# Patient Record
Sex: FEMALE | Race: OTHER | Employment: FULL TIME | URBAN - METROPOLITAN AREA
[De-identification: names, ages, dates, MRNs, and addresses within clinical notes are randomized per-mention and may not be internally consistent; named-entity substitution may affect disease eponyms.]

---

## 2017-09-14 PROCEDURE — 87591 N.GONORRHOEAE DNA AMP PROB: CPT | Performed by: INTERNAL MEDICINE

## 2017-09-14 PROCEDURE — 87491 CHLMYD TRACH DNA AMP PROBE: CPT | Performed by: INTERNAL MEDICINE

## 2017-09-14 PROCEDURE — 88175 CYTOPATH C/V AUTO FLUID REDO: CPT | Performed by: INTERNAL MEDICINE

## 2018-11-21 PROCEDURE — 88175 CYTOPATH C/V AUTO FLUID REDO: CPT | Performed by: INTERNAL MEDICINE

## 2019-03-18 PROCEDURE — 88175 CYTOPATH C/V AUTO FLUID REDO: CPT | Performed by: OBSTETRICS & GYNECOLOGY

## 2020-08-10 PROBLEM — F41.9 ANXIETY: Status: ACTIVE | Noted: 2019-12-01

## 2020-08-10 PROBLEM — G43.909 MIGRAINES: Status: ACTIVE | Noted: 2019-01-01

## 2020-08-10 PROBLEM — F32.A DEPRESSION: Status: ACTIVE | Noted: 2019-12-01

## 2021-07-09 PROBLEM — U07.1 COVID-19 AFFECTING PREGNANCY IN SECOND TRIMESTER: Status: ACTIVE | Noted: 2021-07-09

## 2021-07-09 PROBLEM — O98.512 COVID-19 AFFECTING PREGNANCY IN SECOND TRIMESTER: Status: ACTIVE | Noted: 2021-07-09

## 2021-07-09 PROBLEM — Z3A.28 28 WEEKS GESTATION OF PREGNANCY: Status: ACTIVE | Noted: 2021-07-09

## 2021-07-09 PROBLEM — O36.8121: Status: ACTIVE | Noted: 2021-07-09

## 2022-04-29 ENCOUNTER — TELEPHONE (OUTPATIENT)
Dept: OBGYN CLINIC | Facility: CLINIC | Age: 27
End: 2022-04-29

## 2022-04-29 NOTE — TELEPHONE ENCOUNTER
Patient calling to initiate prenatal care  LMP 2/28/22  Patient is 7-8 weeks on 4/25/22  Confirmation Ultrasound and Appointment scheduled on 5/4/22    Any history of ectopic pregnancy? NO  Any history of miscarriage? NO  Any medications that you are taking on a regular basis other than prenatal vitamins?  FLUOXETINE 20 MG 1X PER DAY   Any bleeding since the first day of last LMP and your positive pregnancy test? [facility]    Patient delivered a stillborn child June 2021

## 2022-04-29 NOTE — TELEPHONE ENCOUNTER
New patient. LMP: 2/28/22   GA: 8 4/7 wks by provided lmp    Contacted patient. Discussed medications- to contact prescribing provider regarding fluoxetine. Zoloft is preferred. can also discuss with MD at visit next week. Provided [facility] contact and [facility] [employer] for support if needed.

## 2022-06-01 ENCOUNTER — ULTRASOUND ENCOUNTER (OUTPATIENT)
Dept: OBGYN CLINIC | Facility: CLINIC | Age: 27
End: 2022-06-01
Payer: COMMERCIAL

## 2022-06-01 ENCOUNTER — OFFICE VISIT (OUTPATIENT)
Dept: OBGYN CLINIC | Facility: CLINIC | Age: 27
End: 2022-06-01
Payer: COMMERCIAL

## 2022-06-01 VITALS
HEIGHT: 59 IN | DIASTOLIC BLOOD PRESSURE: 72 MMHG | SYSTOLIC BLOOD PRESSURE: 106 MMHG | HEART RATE: 108 BPM | WEIGHT: 147.5 LBS | BODY MASS INDEX: 29.73 KG/M2

## 2022-06-01 DIAGNOSIS — N91.1 SECONDARY AMENORRHEA: Primary | ICD-10-CM

## 2022-06-01 PROBLEM — O36.8121: Status: RESOLVED | Noted: 2021-07-09 | Resolved: 2022-06-01

## 2022-06-01 PROBLEM — O98.512 COVID-19 AFFECTING PREGNANCY IN SECOND TRIMESTER: Status: RESOLVED | Noted: 2021-07-09 | Resolved: 2022-06-01

## 2022-06-01 PROBLEM — U07.1 COVID-19 AFFECTING PREGNANCY IN SECOND TRIMESTER: Status: RESOLVED | Noted: 2021-07-09 | Resolved: 2022-06-01

## 2022-06-01 PROBLEM — Z87.59 HISTORY OF IUFD: Status: ACTIVE | Noted: 2022-06-01

## 2022-06-01 PROBLEM — Z3A.28 28 WEEKS GESTATION OF PREGNANCY: Status: RESOLVED | Noted: 2021-07-09 | Resolved: 2022-06-01

## 2022-06-01 PROCEDURE — 76856 US EXAM PELVIC COMPLETE: CPT | Performed by: OBSTETRICS & GYNECOLOGY

## 2022-06-01 PROCEDURE — 3078F DIAST BP <80 MM HG: CPT | Performed by: OBSTETRICS & GYNECOLOGY

## 2022-06-01 PROCEDURE — 3008F BODY MASS INDEX DOCD: CPT | Performed by: OBSTETRICS & GYNECOLOGY

## 2022-06-01 PROCEDURE — 3074F SYST BP LT 130 MM HG: CPT | Performed by: OBSTETRICS & GYNECOLOGY

## 2022-06-01 PROCEDURE — 99203 OFFICE O/P NEW LOW 30 MIN: CPT | Performed by: OBSTETRICS & GYNECOLOGY

## 2022-06-01 RX ORDER — GLYCERIN/MINERAL OIL
LOTION (ML) TOPICAL
COMMUNITY

## 2022-06-28 ENCOUNTER — INITIAL PRENATAL (OUTPATIENT)
Dept: OBGYN CLINIC | Facility: CLINIC | Age: 27
End: 2022-06-28
Payer: COMMERCIAL

## 2022-06-28 VITALS
HEART RATE: 103 BPM | SYSTOLIC BLOOD PRESSURE: 112 MMHG | BODY MASS INDEX: 31 KG/M2 | WEIGHT: 153 LBS | DIASTOLIC BLOOD PRESSURE: 64 MMHG

## 2022-06-28 DIAGNOSIS — F32.A DEPRESSION AFFECTING PREGNANCY: ICD-10-CM

## 2022-06-28 DIAGNOSIS — Z87.59 HISTORY OF IUFD: ICD-10-CM

## 2022-06-28 DIAGNOSIS — Z12.4 ENCOUNTER FOR SCREENING FOR CERVICAL CANCER: ICD-10-CM

## 2022-06-28 DIAGNOSIS — Z34.00 SUPERVISION OF NORMAL FIRST PREGNANCY, ANTEPARTUM: Primary | ICD-10-CM

## 2022-06-28 DIAGNOSIS — Z34.90 PREGNANCY, UNSPECIFIED GESTATIONAL AGE: ICD-10-CM

## 2022-06-28 DIAGNOSIS — O99.340 DEPRESSION AFFECTING PREGNANCY: ICD-10-CM

## 2022-06-28 DIAGNOSIS — F41.9 ANXIETY: ICD-10-CM

## 2022-06-28 LAB
GLUCOSE (URINE DIPSTICK): NEGATIVE MG/DL
MULTISTIX LOT#: NORMAL NUMERIC
PROTEIN (URINE DIPSTICK): NEGATIVE MG/DL

## 2022-06-28 PROCEDURE — 81002 URINALYSIS NONAUTO W/O SCOPE: CPT | Performed by: OBSTETRICS & GYNECOLOGY

## 2022-06-28 PROCEDURE — 3078F DIAST BP <80 MM HG: CPT | Performed by: OBSTETRICS & GYNECOLOGY

## 2022-06-28 PROCEDURE — 3074F SYST BP LT 130 MM HG: CPT | Performed by: OBSTETRICS & GYNECOLOGY

## 2022-06-28 RX ORDER — SERTRALINE HYDROCHLORIDE 25 MG/1
25 TABLET, FILM COATED ORAL DAILY
Qty: 90 TABLET | Refills: 3 | Status: SHIPPED | OUTPATIENT
Start: 2022-06-28

## 2022-07-07 LAB
ABSOLUTE BASOPHILS: 30 CELLS/UL (ref 0–200)
ABSOLUTE EOSINOPHILS: 53 CELLS/UL (ref 15–500)
ABSOLUTE LYMPHOCYTES: 1816 CELLS/UL (ref 850–3900)
ABSOLUTE MONOCYTES: 372 CELLS/UL (ref 200–950)
ABSOLUTE NEUTROPHILS: 5328 CELLS/UL (ref 1500–7800)
BASOPHILS: 0.4 %
EOSINOPHILS: 0.7 %
HEMATOCRIT: 37.2 % (ref 35–45)
HEMOGLOBIN: 12.4 G/DL (ref 11.7–15.5)
LYMPHOCYTES: 23.9 %
MCH: 30.2 PG (ref 27–33)
MCHC: 33.3 G/DL (ref 32–36)
MCV: 90.5 FL (ref 80–100)
MONOCYTES: 4.9 %
MPV: 9.5 FL (ref 7.5–12.5)
NEUTROPHILS: 70.1 %
PLATELET COUNT: 274 THOUSAND/UL (ref 140–400)
RDW: 13.1 % (ref 11–15)
RED BLOOD CELL COUNT: 4.11 MILLION/UL (ref 3.8–5.1)
RUBELLA ANTIBODY (IGG): <0.9 INDEX
SIGNAL TO CUT-OFF: 0.02
WHITE BLOOD CELL COUNT: 7.6 THOUSAND/UL (ref 3.8–10.8)

## 2022-07-11 PROBLEM — Z28.39 RUBELLA NON-IMMUNE STATUS, ANTEPARTUM: Status: ACTIVE | Noted: 2022-07-11

## 2022-07-11 PROBLEM — O09.899 RUBELLA NON-IMMUNE STATUS, ANTEPARTUM: Status: ACTIVE | Noted: 2022-07-11

## 2022-07-26 ENCOUNTER — ROUTINE PRENATAL (OUTPATIENT)
Dept: OBGYN CLINIC | Facility: CLINIC | Age: 27
End: 2022-07-26
Payer: COMMERCIAL

## 2022-07-26 VITALS — DIASTOLIC BLOOD PRESSURE: 62 MMHG | BODY MASS INDEX: 32 KG/M2 | SYSTOLIC BLOOD PRESSURE: 104 MMHG | WEIGHT: 157.38 LBS

## 2022-07-26 DIAGNOSIS — Z87.59 HISTORY OF IUFD: ICD-10-CM

## 2022-07-26 DIAGNOSIS — Z28.39 RUBELLA NON-IMMUNE STATUS, ANTEPARTUM: ICD-10-CM

## 2022-07-26 DIAGNOSIS — Z34.00 SUPERVISION OF NORMAL FIRST PREGNANCY, ANTEPARTUM: Primary | ICD-10-CM

## 2022-07-26 DIAGNOSIS — O09.899 RUBELLA NON-IMMUNE STATUS, ANTEPARTUM: ICD-10-CM

## 2022-07-26 DIAGNOSIS — Z13.79 GENETIC SCREENING: ICD-10-CM

## 2022-07-26 DIAGNOSIS — F32.A DEPRESSION AFFECTING PREGNANCY: ICD-10-CM

## 2022-07-26 DIAGNOSIS — Z3A.16 16 WEEKS GESTATION OF PREGNANCY: ICD-10-CM

## 2022-07-26 DIAGNOSIS — O99.340 DEPRESSION AFFECTING PREGNANCY: ICD-10-CM

## 2022-07-26 PROCEDURE — 3078F DIAST BP <80 MM HG: CPT | Performed by: OBSTETRICS & GYNECOLOGY

## 2022-07-26 PROCEDURE — 81002 URINALYSIS NONAUTO W/O SCOPE: CPT | Performed by: OBSTETRICS & GYNECOLOGY

## 2022-07-26 PROCEDURE — 3074F SYST BP LT 130 MM HG: CPT | Performed by: OBSTETRICS & GYNECOLOGY

## 2022-07-26 NOTE — PROGRESS NOTES
DONELL    GA 16w1d   22  1655   BP: 104/62   Weight: 157 lb 6 oz (71.4 kg)       Doing well. No complaints. Denies abdominal/pelvic pain or vaginal bleeding. Rh +   Genetic screening requested cffDNA and order provided. D/w patient MSAFP, order provided. Recommend Anatomy scan 20 wks by level 2 US  Prenatal labs reviewed   Reports improvement with Zoloft for depression and anxiety      Problem List Items Addressed This Visit        Obstetrical    Depression affecting pregnancy    Supervision of normal first pregnancy, antepartum - Primary    Relevant Orders    MATERNIT-21 SEQUENOM KIT COLLECTION    AFP MATERNAL SERUM    Rubella non-immune status, antepartum       Other    History of IUFD      Other Visit Diagnoses     16 weeks gestation of pregnancy        Relevant Orders    URINALYSIS NONAUTO W/O SCOPE (OB URISTIX) (Completed)    Genetic screening        Relevant Orders    MATERNIT-21 SEQUENOM KIT COLLECTION    AFP MATERNAL SERUM            RTC in 4 weeks           Note to patient and family   The [facility] makes medical notes available to patients in the interest of transparency. However, please be advised that this is a medical document. It is intended as jfro-lc-mxpa communication. It is written and medical language may contain abbreviations or verbiage that are technical and unfamiliar. It may appear blunt or direct. Medical documents are intended to carry relevant information, facts as evident, and the clinical opinion of the practitioner.

## 2022-07-28 ENCOUNTER — LAB ENCOUNTER (OUTPATIENT)
Dept: LAB | Facility: HOSPITAL | Age: 27
End: 2022-07-28
Attending: OBSTETRICS & GYNECOLOGY
Payer: COMMERCIAL

## 2022-07-28 DIAGNOSIS — Z34.00 SUPERVISION OF NORMAL FIRST PREGNANCY: Primary | ICD-10-CM

## 2022-07-28 DIAGNOSIS — Z13.79 GENETIC SCREENING: ICD-10-CM

## 2022-07-28 PROCEDURE — 82105 ALPHA-FETOPROTEIN SERUM: CPT

## 2022-07-28 PROCEDURE — 36415 COLL VENOUS BLD VENIPUNCTURE: CPT

## 2022-07-29 LAB
AFP MOM: 1.06
AFP, SERUM: 34.1 NG/ML
CALC'D GESTATIONAL AGE: 16.4 WEEKS
DONOR EGG: NO
HX OF NEURAL TUBE DEFECTS: NO
INSULIN DEPEND DIABETIC: NO
MATERNAL WEIGHT: 157 LBS
NUMBER OF FETUSES: 1
PREV PREGNANCY DOWN SYND: NO
REPEAT SPECIMEN: NO

## 2022-07-30 LAB
AFP SMOKING: NO
FAMILY HX NEURAL TUBE DEFECT: NO
INSULIN REQ MATERNAL DIABETES: NO
MATERNAL AGE OF DELIVERY: 27.7 YR
MOM FOR AFP: 0.91
PATIENT'S AFP: 32 NG/ML

## 2022-08-01 ENCOUNTER — TELEPHONE (OUTPATIENT)
Dept: OBGYN CLINIC | Facility: CLINIC | Age: 27
End: 2022-08-01

## 2022-08-01 NOTE — TELEPHONE ENCOUNTER
[id number] results reviewed. Negative for chromosome abnormality of 21, 18 and 13. Genetic screening within normal limits and no further intervention indicated at this time. Patient contacted and notified. Patient informed of negative results. Patient requested [facility] message with gender formation. [facility] message sent. All questions and concerns were addressed.

## 2022-08-19 ENCOUNTER — TELEPHONE (OUTPATIENT)
Dept: OBGYN CLINIC | Facility: CLINIC | Age: 27
End: 2022-08-19

## 2022-08-19 NOTE — TELEPHONE ENCOUNTER
Pt called, she is 19 wks ob now and she has a dentist appt on Tuesday, 8/23 for cleaning. Because she will be a new pt to them, they told her they need to do an x ray but before they can do it they need a note form us that it's okay for her to have. Kindly send the note on my chart and she will print it from there for her to bring.

## 2022-08-23 ENCOUNTER — OFFICE VISIT (OUTPATIENT)
Dept: PERINATAL CARE | Facility: HOSPITAL | Age: 27
End: 2022-08-23
Attending: OBSTETRICS & GYNECOLOGY
Payer: COMMERCIAL

## 2022-08-23 VITALS
HEART RATE: 99 BPM | WEIGHT: 163 LBS | DIASTOLIC BLOOD PRESSURE: 72 MMHG | SYSTOLIC BLOOD PRESSURE: 108 MMHG | HEIGHT: 59 IN | BODY MASS INDEX: 32.86 KG/M2

## 2022-08-23 DIAGNOSIS — Z87.59 HISTORY OF IUFD: Primary | ICD-10-CM

## 2022-08-23 DIAGNOSIS — F32.A DEPRESSION AFFECTING PREGNANCY: ICD-10-CM

## 2022-08-23 DIAGNOSIS — O99.212 OBESITY AFFECTING PREGNANCY IN SECOND TRIMESTER: ICD-10-CM

## 2022-08-23 DIAGNOSIS — Z87.59 HISTORY OF IUFD: ICD-10-CM

## 2022-08-23 DIAGNOSIS — O99.340 DEPRESSION AFFECTING PREGNANCY: ICD-10-CM

## 2022-08-23 PROCEDURE — 76811 OB US DETAILED SNGL FETUS: CPT | Performed by: OBSTETRICS & GYNECOLOGY

## 2022-08-23 RX ORDER — ASPIRIN 81 MG/1
121 TABLET ORAL DAILY
COMMUNITY

## 2022-08-30 ENCOUNTER — ROUTINE PRENATAL (OUTPATIENT)
Dept: OBGYN CLINIC | Facility: CLINIC | Age: 27
End: 2022-08-30
Payer: COMMERCIAL

## 2022-08-30 VITALS — DIASTOLIC BLOOD PRESSURE: 60 MMHG | BODY MASS INDEX: 33 KG/M2 | SYSTOLIC BLOOD PRESSURE: 104 MMHG | WEIGHT: 164 LBS

## 2022-08-30 DIAGNOSIS — Z34.00 SUPERVISION OF NORMAL FIRST PREGNANCY, ANTEPARTUM: ICD-10-CM

## 2022-08-30 DIAGNOSIS — Z34.80 PRENATAL CARE OF MULTIGRAVIDA, ANTEPARTUM: Primary | ICD-10-CM

## 2022-08-30 LAB
GLUCOSE (URINE DIPSTICK): NEGATIVE MG/DL
MULTISTIX LOT#: NORMAL NUMERIC

## 2022-08-30 PROCEDURE — 3074F SYST BP LT 130 MM HG: CPT | Performed by: NURSE PRACTITIONER

## 2022-08-30 PROCEDURE — 81002 URINALYSIS NONAUTO W/O SCOPE: CPT | Performed by: NURSE PRACTITIONER

## 2022-08-30 PROCEDURE — 3078F DIAST BP <80 MM HG: CPT | Performed by: NURSE PRACTITIONER

## 2022-08-30 NOTE — PROGRESS NOTES
DONELL  Doing well, no immediate concerns or questions.   FM is good  RH positive  S/P Anatomy scan with MFM  1 hr glucose/ CBC ordered, advised she wait 3 weeks  RTC 4wks

## 2022-09-14 LAB
ABSOLUTE BASOPHILS: 39 CELLS/UL (ref 0–200)
ABSOLUTE EOSINOPHILS: 94 CELLS/UL (ref 15–500)
ABSOLUTE LYMPHOCYTES: 1755 CELLS/UL (ref 850–3900)
ABSOLUTE MONOCYTES: 390 CELLS/UL (ref 200–950)
ABSOLUTE NEUTROPHILS: 5522 CELLS/UL (ref 1500–7800)
BASOPHILS: 0.5 %
EOSINOPHILS: 1.2 %
GLUCOSE, GESTATIONAL SCREEN (50G)-130 CUTOFF: 78 MG/DL
HEMATOCRIT: 40.4 % (ref 35–45)
HEMOGLOBIN: 13.6 G/DL (ref 11.7–15.5)
LYMPHOCYTES: 22.5 %
MCH: 31.2 PG (ref 27–33)
MCHC: 33.7 G/DL (ref 32–36)
MCV: 92.7 FL (ref 80–100)
MONOCYTES: 5 %
MPV: 9.6 FL (ref 7.5–12.5)
NEUTROPHILS: 70.8 %
PLATELET COUNT: 312 THOUSAND/UL (ref 140–400)
RDW: 12.2 % (ref 11–15)
RED BLOOD CELL COUNT: 4.36 MILLION/UL (ref 3.8–5.1)
WHITE BLOOD CELL COUNT: 7.8 THOUSAND/UL (ref 3.8–10.8)

## 2022-09-15 DIAGNOSIS — F32.A DEPRESSION AFFECTING PREGNANCY: ICD-10-CM

## 2022-09-15 DIAGNOSIS — O99.340 DEPRESSION AFFECTING PREGNANCY: ICD-10-CM

## 2022-09-15 DIAGNOSIS — F41.9 ANXIETY: ICD-10-CM

## 2022-09-15 RX ORDER — SERTRALINE HYDROCHLORIDE 25 MG/1
25 TABLET, FILM COATED ORAL DAILY
Qty: 90 TABLET | Refills: 3 | OUTPATIENT
Start: 2022-09-15

## 2022-09-27 ENCOUNTER — ROUTINE PRENATAL (OUTPATIENT)
Dept: OBGYN CLINIC | Facility: CLINIC | Age: 27
End: 2022-09-27

## 2022-09-27 VITALS
DIASTOLIC BLOOD PRESSURE: 60 MMHG | HEIGHT: 59 IN | BODY MASS INDEX: 34.71 KG/M2 | WEIGHT: 172.19 LBS | SYSTOLIC BLOOD PRESSURE: 102 MMHG

## 2022-09-27 DIAGNOSIS — Z34.00 SUPERVISION OF NORMAL FIRST PREGNANCY, ANTEPARTUM: Primary | ICD-10-CM

## 2022-09-27 LAB
GLUCOSE (URINE DIPSTICK): NEGATIVE MG/DL
MULTISTIX LOT#: NORMAL NUMERIC

## 2022-09-27 PROCEDURE — 81002 URINALYSIS NONAUTO W/O SCOPE: CPT | Performed by: OBSTETRICS & GYNECOLOGY

## 2022-09-27 PROCEDURE — 3078F DIAST BP <80 MM HG: CPT | Performed by: OBSTETRICS & GYNECOLOGY

## 2022-09-27 PROCEDURE — 3074F SYST BP LT 130 MM HG: CPT | Performed by: OBSTETRICS & GYNECOLOGY

## 2022-09-27 PROCEDURE — 3008F BODY MASS INDEX DOCD: CPT | Performed by: OBSTETRICS & GYNECOLOGY

## 2022-09-27 NOTE — PROGRESS NOTES
DONELL--25w1d    Doing overall ok. Denies Vb, LOF, contractions. Positive fetal movement. Wonders when she should start kick counts and how to prevent subsequent IUFD   BSUS done due to fetal movement, FHT present and normal  Cephalic and visibly normal RAHEL    A/P:   1. FHT-P  2. PNL: HIV ordered  3. RH positive  4. History of 27 week IUFD: normal level 2, has appt for f/u US with MFM on 10/11, continue serial monthly US and start NSTs at 32 weeks. We reviewed the controversy regarding kick counts, and I did discuss that recent literature does not support kick counts as preventative for IUFD. However, she should feel normal fetal movement for herself, and at this point daily FM  5.  Reviewed TDAP for upcoming visit      Return in 2-3 weeks

## 2022-09-27 NOTE — PATIENT INSTRUCTIONS
[facility] has had significant outbreaks of pertussis (whooping cough) for the last few years. Therefore, the CDC recommends that all pregnant women receive a Tdap vaccine during pregnancy, regardless of whether you have received one previously. The vaccine reduces your chances of kay the illness, and also provides some natural immunity to your baby for possible exposures after birth. The best time to get the vaccine is between 27 and 36 weeks. Baby caregivers (grandparents, spouse) should also receive the vaccine. Influenza- Pregnant women who develop the flu are more prone to serious complications that can affect both mother and baby. The CDC recommends that all women who will be pregnant during flu season (October to May) receive the flu vaccine (injection only, not nasal spray). The vaccine can be given during any stage of pregnancy. Both vaccines are offered in our office, as well as through many pharmacies and primary care offices. Pregnancy/Nursing and Covid-19 Vaccine   As the [facility]  begins to make progress in administering the Covid-19 vaccine, we understand that our pregnant and breastfeeding patients will have questions about the safety of the Covid-19 vaccine. Keep in mind that information is evolving rapidly and that recommendations can change over time. The CDC, the Society for Maternal Fetal Medicine, and the [address] [place] [address] of Obstetrics & Gynecology now recommend that all pregnant and breastfeeding women consider getting the Covid-19 vaccine, in consultation with their healthcare provider. Factors to consider include the available safety data, risks to pregnant women from Covid-19 infection, and your individual risk for infection and severe disease, based on your risk of exposure and any other medical risk factors that you may have.   A recent study of 36,000 pregnant women confirmed the safety and effectiveness of the vaccine, with no increase risk of miscarriage. Here are some facts to consider when you are making a decision:   [1] Pregnant women are at higher risk for acquiring Covid-19 infection and have a subsequent higher risk for the following:  - severe illness  - adverse pregnancy outcomes including  birth  - hospitalization, ICU admission, need for mechanical ventilation and death  [2] The mRNA vaccines that are currently available do not contain live virus, do not enter the nucleus, do not cause genetic changes, do not alter human DNA, and cannot cause Covid-19 illness. [3] mRNA breaks down quickly, so it's unlikely that any of the mRNA in the vaccine would be able to get into your breast milk or into your baby through the placenta. [4] The antibodies that your body produces in response to the vaccine will be passed to your baby through the placenta and provide some protection for your  baby against Covid-19 infection. [5] Whether you ultimately decide to receive the vaccine or not, do not forget to continue with other preventative measures including frequent hand washing, avoiding crowds, physical distancing and wearing a mask.

## 2022-10-11 ENCOUNTER — ROUTINE PRENATAL (OUTPATIENT)
Dept: OBGYN CLINIC | Facility: CLINIC | Age: 27
End: 2022-10-11
Payer: COMMERCIAL

## 2022-10-11 ENCOUNTER — ULTRASOUND ENCOUNTER (OUTPATIENT)
Dept: PERINATAL CARE | Facility: HOSPITAL | Age: 27
End: 2022-10-11
Attending: OBSTETRICS & GYNECOLOGY
Payer: COMMERCIAL

## 2022-10-11 VITALS
SYSTOLIC BLOOD PRESSURE: 108 MMHG | WEIGHT: 178 LBS | DIASTOLIC BLOOD PRESSURE: 72 MMHG | HEIGHT: 59 IN | BODY MASS INDEX: 35.88 KG/M2

## 2022-10-11 VITALS
BODY MASS INDEX: 35 KG/M2 | SYSTOLIC BLOOD PRESSURE: 110 MMHG | WEIGHT: 175 LBS | DIASTOLIC BLOOD PRESSURE: 76 MMHG | HEART RATE: 101 BPM

## 2022-10-11 DIAGNOSIS — O99.212 OBESITY AFFECTING PREGNANCY IN SECOND TRIMESTER: ICD-10-CM

## 2022-10-11 DIAGNOSIS — Z34.00 SUPERVISION OF NORMAL FIRST PREGNANCY, ANTEPARTUM: ICD-10-CM

## 2022-10-11 DIAGNOSIS — O99.340 DEPRESSION AFFECTING PREGNANCY: ICD-10-CM

## 2022-10-11 DIAGNOSIS — Z87.59 HISTORY OF IUFD: ICD-10-CM

## 2022-10-11 DIAGNOSIS — O99.212 OBESITY AFFECTING PREGNANCY IN SECOND TRIMESTER: Primary | ICD-10-CM

## 2022-10-11 DIAGNOSIS — O09.899 RUBELLA NON-IMMUNE STATUS, ANTEPARTUM: ICD-10-CM

## 2022-10-11 DIAGNOSIS — F32.A DEPRESSION AFFECTING PREGNANCY: ICD-10-CM

## 2022-10-11 DIAGNOSIS — Z28.39 RUBELLA NON-IMMUNE STATUS, ANTEPARTUM: ICD-10-CM

## 2022-10-11 DIAGNOSIS — Z34.00 SUPERVISION OF NORMAL FIRST PREGNANCY, ANTEPARTUM: Primary | ICD-10-CM

## 2022-10-11 DIAGNOSIS — Z34.80 PRENATAL CARE OF MULTIGRAVIDA, ANTEPARTUM: ICD-10-CM

## 2022-10-11 LAB
GLUCOSE (URINE DIPSTICK): NEGATIVE MG/DL
MULTISTIX LOT#: NORMAL NUMERIC

## 2022-10-11 PROCEDURE — 3074F SYST BP LT 130 MM HG: CPT | Performed by: OBSTETRICS & GYNECOLOGY

## 2022-10-11 PROCEDURE — 81002 URINALYSIS NONAUTO W/O SCOPE: CPT | Performed by: OBSTETRICS & GYNECOLOGY

## 2022-10-11 PROCEDURE — 3008F BODY MASS INDEX DOCD: CPT | Performed by: OBSTETRICS & GYNECOLOGY

## 2022-10-11 PROCEDURE — 3078F DIAST BP <80 MM HG: CPT | Performed by: OBSTETRICS & GYNECOLOGY

## 2022-10-11 PROCEDURE — 76816 OB US FOLLOW-UP PER FETUS: CPT | Performed by: OBSTETRICS & GYNECOLOGY

## 2022-10-11 NOTE — PROGRESS NOTES
DONELL    GA: 27w1d   10/11/22  1314   BP: 108/72   Weight: 178 lb (80.7 kg)   Height: 59\"       Doing well. Denies LOF/VB/uctx. +FM. RH +  S/P Anatomy scan by level 2 US  1 hr glucose and CBC (24-28wks) wnl  Considering Tdap   Reminded to complete HIV    Problem List Items Addressed This Visit        Obstetrical    Depression affecting pregnancy    Supervision of normal first pregnancy, antepartum    Rubella non-immune status, antepartum       Other    History of IUFD      Other Visit Diagnoses     Prenatal care of multigravida, antepartum    -  Primary    Relevant Orders    URINALYSIS NONAUTO W/O SCOPE (OB URISTIX) (Completed)            RTC q2wks        Note to patient and family   The [facility] makes medical notes available to patients in the interest of transparency. However, please be advised that this is a medical document. It is intended as qqpp-qn-ljgt communication. It is written and medical language may contain abbreviations or verbiage that are technical and unfamiliar. It may appear blunt or direct. Medical documents are intended to carry relevant information, facts as evident, and the clinical opinion of the practitioner.

## 2022-10-26 ENCOUNTER — ROUTINE PRENATAL (OUTPATIENT)
Dept: OBGYN CLINIC | Facility: CLINIC | Age: 27
End: 2022-10-26
Payer: COMMERCIAL

## 2022-10-26 VITALS
BODY MASS INDEX: 36.32 KG/M2 | DIASTOLIC BLOOD PRESSURE: 60 MMHG | SYSTOLIC BLOOD PRESSURE: 100 MMHG | WEIGHT: 180.19 LBS | HEIGHT: 59 IN

## 2022-10-26 DIAGNOSIS — Z34.80 PRENATAL CARE OF MULTIGRAVIDA, ANTEPARTUM: Primary | ICD-10-CM

## 2022-10-26 DIAGNOSIS — Z87.59 HISTORY OF IUFD: ICD-10-CM

## 2022-10-26 DIAGNOSIS — O99.210 OBESITY AFFECTING PREGNANCY, ANTEPARTUM: ICD-10-CM

## 2022-10-26 DIAGNOSIS — Z23 NEED FOR VACCINATION: ICD-10-CM

## 2022-10-26 PROCEDURE — 90471 IMMUNIZATION ADMIN: CPT | Performed by: NURSE PRACTITIONER

## 2022-10-26 PROCEDURE — 3074F SYST BP LT 130 MM HG: CPT | Performed by: NURSE PRACTITIONER

## 2022-10-26 PROCEDURE — 90715 TDAP VACCINE 7 YRS/> IM: CPT | Performed by: NURSE PRACTITIONER

## 2022-10-26 PROCEDURE — 3078F DIAST BP <80 MM HG: CPT | Performed by: NURSE PRACTITIONER

## 2022-10-26 PROCEDURE — 3008F BODY MASS INDEX DOCD: CPT | Performed by: NURSE PRACTITIONER

## 2022-10-26 PROCEDURE — 81002 URINALYSIS NONAUTO W/O SCOPE: CPT | Performed by: NURSE PRACTITIONER

## 2022-10-26 NOTE — PROGRESS NOTES
DONELL 29w2d    Doing well, +FM  Denies contractions  Denies LOF, VB    1. FHT-P  2. PNL:  Complete HIV  3. Obesity/hx of IUFD: monthly growth and BPP 32+ and weekly NST 32weeks  4.  Immunizations: TDAP today, discussed flu     Return in 2 weeks

## 2022-11-08 ENCOUNTER — ULTRASOUND ENCOUNTER (OUTPATIENT)
Dept: PERINATAL CARE | Facility: HOSPITAL | Age: 27
End: 2022-11-08
Attending: OBSTETRICS & GYNECOLOGY
Payer: COMMERCIAL

## 2022-11-08 VITALS
WEIGHT: 182 LBS | SYSTOLIC BLOOD PRESSURE: 108 MMHG | HEART RATE: 96 BPM | BODY MASS INDEX: 36.69 KG/M2 | HEIGHT: 59 IN | DIASTOLIC BLOOD PRESSURE: 72 MMHG

## 2022-11-08 DIAGNOSIS — O99.212 OBESITY AFFECTING PREGNANCY IN SECOND TRIMESTER: ICD-10-CM

## 2022-11-08 DIAGNOSIS — F32.A DEPRESSION AFFECTING PREGNANCY: ICD-10-CM

## 2022-11-08 DIAGNOSIS — O99.212 OBESITY AFFECTING PREGNANCY IN SECOND TRIMESTER: Primary | ICD-10-CM

## 2022-11-08 DIAGNOSIS — O99.340 DEPRESSION AFFECTING PREGNANCY: ICD-10-CM

## 2022-11-08 DIAGNOSIS — Z87.59 HISTORY OF IUFD: ICD-10-CM

## 2022-11-08 PROCEDURE — 76816 OB US FOLLOW-UP PER FETUS: CPT | Performed by: OBSTETRICS & GYNECOLOGY

## 2022-11-15 ENCOUNTER — ROUTINE PRENATAL (OUTPATIENT)
Dept: OBGYN CLINIC | Facility: CLINIC | Age: 27
End: 2022-11-15
Payer: COMMERCIAL

## 2022-11-15 ENCOUNTER — APPOINTMENT (OUTPATIENT)
Dept: OBGYN CLINIC | Facility: CLINIC | Age: 27
End: 2022-11-15
Payer: COMMERCIAL

## 2022-11-15 VITALS
DIASTOLIC BLOOD PRESSURE: 70 MMHG | HEIGHT: 59 IN | SYSTOLIC BLOOD PRESSURE: 110 MMHG | WEIGHT: 182.63 LBS | BODY MASS INDEX: 36.82 KG/M2

## 2022-11-15 DIAGNOSIS — Z87.59 HISTORY OF IUFD: ICD-10-CM

## 2022-11-15 DIAGNOSIS — Z23 NEED FOR VACCINATION: ICD-10-CM

## 2022-11-15 DIAGNOSIS — Z34.80 PRENATAL CARE OF MULTIGRAVIDA, ANTEPARTUM: Primary | ICD-10-CM

## 2022-11-15 PROCEDURE — 59025 FETAL NON-STRESS TEST: CPT | Performed by: OBSTETRICS & GYNECOLOGY

## 2022-11-15 PROCEDURE — 3078F DIAST BP <80 MM HG: CPT | Performed by: OBSTETRICS & GYNECOLOGY

## 2022-11-15 PROCEDURE — 3074F SYST BP LT 130 MM HG: CPT | Performed by: OBSTETRICS & GYNECOLOGY

## 2022-11-15 PROCEDURE — 81002 URINALYSIS NONAUTO W/O SCOPE: CPT | Performed by: OBSTETRICS & GYNECOLOGY

## 2022-11-15 PROCEDURE — 3008F BODY MASS INDEX DOCD: CPT | Performed by: OBSTETRICS & GYNECOLOGY

## 2022-11-15 NOTE — PROGRESS NOTES
32w1d seen for routine OB visit. Denies ctx, lof, vb. Reports good FM. Patient Active Problem List:     Depression affecting pregnancy     Anxiety     Migraines     BMI 29.0-29.9,adult     History of IUFD     Supervision of normal first pregnancy, antepartum     Rubella non-immune status, antepartum       Gen: AAOx3, NAD  Resp: breathing comfortably  Abdomen: gravid, soft, nontender  Ext: nontender, no edema    NST - baseline 145, moderate variability, +15x15 accels, no decels  [place] - quiet     Plan:  - reactive NST, continue weekly  - MFM growth US   - flu vaccine today, s/p tdap  - reminded to complete HIV screening  - return precautions reviewed    RTO in 1 week(s).

## 2022-11-21 ENCOUNTER — ROUTINE PRENATAL (OUTPATIENT)
Dept: OBGYN CLINIC | Facility: CLINIC | Age: 27
End: 2022-11-21
Payer: COMMERCIAL

## 2022-11-21 ENCOUNTER — ULTRASOUND ENCOUNTER (OUTPATIENT)
Dept: PERINATAL CARE | Facility: HOSPITAL | Age: 27
End: 2022-11-21
Attending: OBSTETRICS & GYNECOLOGY
Payer: COMMERCIAL

## 2022-11-21 ENCOUNTER — APPOINTMENT (OUTPATIENT)
Dept: OBGYN CLINIC | Facility: CLINIC | Age: 27
End: 2022-11-21
Payer: COMMERCIAL

## 2022-11-21 VITALS
SYSTOLIC BLOOD PRESSURE: 108 MMHG | DIASTOLIC BLOOD PRESSURE: 60 MMHG | WEIGHT: 186.19 LBS | HEIGHT: 59 IN | BODY MASS INDEX: 37.53 KG/M2

## 2022-11-21 DIAGNOSIS — Z34.00 SUPERVISION OF NORMAL FIRST PREGNANCY, ANTEPARTUM: ICD-10-CM

## 2022-11-21 DIAGNOSIS — O99.213 OBESITY AFFECTING PREGNANCY IN THIRD TRIMESTER: ICD-10-CM

## 2022-11-21 DIAGNOSIS — Z87.59 HISTORY OF IUFD: ICD-10-CM

## 2022-11-21 DIAGNOSIS — Z87.59 HISTORY OF IUFD: Primary | ICD-10-CM

## 2022-11-21 DIAGNOSIS — F41.9 ANXIETY: ICD-10-CM

## 2022-11-21 DIAGNOSIS — O99.210 OBESITY AFFECTING PREGNANCY: ICD-10-CM

## 2022-11-21 DIAGNOSIS — F32.A DEPRESSION AFFECTING PREGNANCY: ICD-10-CM

## 2022-11-21 DIAGNOSIS — O36.8390 VARIABLE FETAL HEART RATE DECELERATIONS, ANTEPARTUM: ICD-10-CM

## 2022-11-21 DIAGNOSIS — O28.8 NON-REACTIVE NST (NON-STRESS TEST): ICD-10-CM

## 2022-11-21 DIAGNOSIS — Z34.80 PRENATAL CARE OF MULTIGRAVIDA, ANTEPARTUM: Primary | ICD-10-CM

## 2022-11-21 DIAGNOSIS — O99.340 DEPRESSION AFFECTING PREGNANCY: ICD-10-CM

## 2022-11-21 DIAGNOSIS — O99.210 OBESITY AFFECTING PREGNANCY, ANTEPARTUM: ICD-10-CM

## 2022-11-21 PROCEDURE — 76815 OB US LIMITED FETUS(S): CPT

## 2022-11-21 PROCEDURE — 3078F DIAST BP <80 MM HG: CPT | Performed by: NURSE PRACTITIONER

## 2022-11-21 PROCEDURE — 59025 FETAL NON-STRESS TEST: CPT | Performed by: NURSE PRACTITIONER

## 2022-11-21 PROCEDURE — 3074F SYST BP LT 130 MM HG: CPT | Performed by: NURSE PRACTITIONER

## 2022-11-21 PROCEDURE — 76819 FETAL BIOPHYS PROFIL W/O NST: CPT | Performed by: OBSTETRICS & GYNECOLOGY

## 2022-11-21 PROCEDURE — 81002 URINALYSIS NONAUTO W/O SCOPE: CPT | Performed by: NURSE PRACTITIONER

## 2022-11-21 PROCEDURE — 3008F BODY MASS INDEX DOCD: CPT | Performed by: NURSE PRACTITIONER

## 2022-11-21 NOTE — PROGRESS NOTES
DONELL  Doing well, no concerns  GOOD FM  Denies VB/LOF/uctx  Complete HIV  NST reactive, few variable noted.  Will have her see MFM for BPP  RTC in 1 week with NST  Fetal movement instructions given

## 2022-11-21 NOTE — PROGRESS NOTES
Ultrasound Findings:  Single IUP in cephalic presentation. Placenta is posterior. Cardiac activity is present at 149 bpm  MVP is 3.6 cm . RAHEL 12.6 cm  BPP is 8/8.

## 2022-11-29 ENCOUNTER — APPOINTMENT (OUTPATIENT)
Dept: OBGYN CLINIC | Facility: CLINIC | Age: 27
End: 2022-11-29
Payer: COMMERCIAL

## 2022-11-29 ENCOUNTER — ROUTINE PRENATAL (OUTPATIENT)
Dept: OBGYN CLINIC | Facility: CLINIC | Age: 27
End: 2022-11-29
Payer: COMMERCIAL

## 2022-11-29 VITALS
BODY MASS INDEX: 38.18 KG/M2 | SYSTOLIC BLOOD PRESSURE: 102 MMHG | DIASTOLIC BLOOD PRESSURE: 60 MMHG | HEIGHT: 59 IN | WEIGHT: 189.38 LBS

## 2022-11-29 DIAGNOSIS — Z34.80 PRENATAL CARE OF MULTIGRAVIDA, ANTEPARTUM: Primary | ICD-10-CM

## 2022-11-29 DIAGNOSIS — Z87.59 HISTORY OF IUFD: ICD-10-CM

## 2022-11-29 LAB
GLUCOSE (URINE DIPSTICK): NEGATIVE MG/DL
MULTISTIX LOT#: NORMAL NUMERIC

## 2022-11-29 PROCEDURE — 81002 URINALYSIS NONAUTO W/O SCOPE: CPT | Performed by: OBSTETRICS & GYNECOLOGY

## 2022-11-29 PROCEDURE — 3074F SYST BP LT 130 MM HG: CPT | Performed by: OBSTETRICS & GYNECOLOGY

## 2022-11-29 PROCEDURE — 3008F BODY MASS INDEX DOCD: CPT | Performed by: OBSTETRICS & GYNECOLOGY

## 2022-11-29 PROCEDURE — 3078F DIAST BP <80 MM HG: CPT | Performed by: OBSTETRICS & GYNECOLOGY

## 2022-11-29 PROCEDURE — 59025 FETAL NON-STRESS TEST: CPT | Performed by: OBSTETRICS & GYNECOLOGY

## 2022-11-29 NOTE — PROGRESS NOTES
Patient presents with:  Prenatal Care: Demario/nst    Routine prenatal visit without complaints. Patient denies any bleeding, leaking fluid, cramping, or regular uterine contractions. Good fetal movement. NST: see report. Reactive  Assessment/Plan:  34w1d doing well  Hx of IUFD- weekly NST  Kick counts reviewed. Reviewed  labor signs and symptoms. Diagnoses and all orders for this visit:    Prenatal care of multigravida, antepartum  -     URINALYSIS NONAUTO W/O SCOPE (OB URISTIX)    History of IUFD  -     FETAL NON-STRESS TEST [facility] ONLY [id number];  Future       Return in about 1 week (around 2022) for Routine Prenatal Visit, NST.

## 2022-12-05 NOTE — PATIENT INSTRUCTIONS
FETAL MOVEMENT CHART    Begin counting fetal movements at 32 weeks of pregnancy. You may find that your baby is more active after eating or drinking. We want you to time how long it takes to feel 10 movements (kicks, flutters, swishes or rolls). You should feel at least 10 movements in 2 hours. You will likely feel 10 movements in less than 2 hours. If you have concerns, you can use the attached table to record movements. Record the time you feel the first movement and the tenth movement. This will help you observe patterns and discover how long it normally takes your baby to move 10 times. Please call us if any of the following occurs: You do not get the tenth movement in 2 hours  You have not felt the baby move for a couple of hours  It is taking longer each day to get the tenth movement    The main point is we want you to know the characteristics of your baby, so you can tell the doctor or nurse if something different is happening. Again, if the baby has not moved 10 times in 2 hours, please give the office a call.     If our office is closed, the answering service will page the doctor on-call.  ------------------------------

## 2022-12-05 NOTE — PROGRESS NOTES
DONELL 35w0d    Doing well, +FM  no contractions  no LOF, VB      1. FHT-NST reactive  2. PNL:  GBS at next visit  3. History of IUFD: had US with MFM today. Results normal. Continue weekly NST. Discussed IOL at 39 weeks, she accepts. Scheduled for pitocin on 1/3/2022 at 07:15, will need cytotec added if not dilated  4. Mode of delivery:  anticipated   5.  Immunizations: s/p TDAP    Return in 1 weeks

## 2022-12-06 ENCOUNTER — OFFICE VISIT (OUTPATIENT)
Dept: PERINATAL CARE | Facility: HOSPITAL | Age: 27
End: 2022-12-06
Attending: OBSTETRICS & GYNECOLOGY
Payer: COMMERCIAL

## 2022-12-06 ENCOUNTER — APPOINTMENT (OUTPATIENT)
Dept: OBGYN CLINIC | Facility: CLINIC | Age: 27
End: 2022-12-06
Payer: COMMERCIAL

## 2022-12-06 ENCOUNTER — ROUTINE PRENATAL (OUTPATIENT)
Dept: OBGYN CLINIC | Facility: CLINIC | Age: 27
End: 2022-12-06
Payer: COMMERCIAL

## 2022-12-06 ENCOUNTER — TELEPHONE (OUTPATIENT)
Dept: OBGYN CLINIC | Facility: CLINIC | Age: 27
End: 2022-12-06

## 2022-12-06 VITALS — BODY MASS INDEX: 39 KG/M2 | SYSTOLIC BLOOD PRESSURE: 112 MMHG | WEIGHT: 190.63 LBS | DIASTOLIC BLOOD PRESSURE: 78 MMHG

## 2022-12-06 VITALS
SYSTOLIC BLOOD PRESSURE: 116 MMHG | HEART RATE: 106 BPM | WEIGHT: 189 LBS | DIASTOLIC BLOOD PRESSURE: 81 MMHG | BODY MASS INDEX: 38 KG/M2

## 2022-12-06 DIAGNOSIS — Z20.822 ENCOUNTER FOR PREPROCEDURE SCREENING LABORATORY TESTING FOR COVID-19: Primary | ICD-10-CM

## 2022-12-06 DIAGNOSIS — Z87.59 HISTORY OF IUFD: ICD-10-CM

## 2022-12-06 DIAGNOSIS — Z87.59 HISTORY OF IUFD: Primary | ICD-10-CM

## 2022-12-06 DIAGNOSIS — Z01.812 ENCOUNTER FOR PREPROCEDURE SCREENING LABORATORY TESTING FOR COVID-19: Primary | ICD-10-CM

## 2022-12-06 DIAGNOSIS — O99.213 OBESITY AFFECTING PREGNANCY IN THIRD TRIMESTER: Primary | ICD-10-CM

## 2022-12-06 DIAGNOSIS — O99.213 OBESITY AFFECTING PREGNANCY IN THIRD TRIMESTER: ICD-10-CM

## 2022-12-06 LAB
GLUCOSE (URINE DIPSTICK): NEGATIVE MG/DL
MULTISTIX LOT#: NORMAL NUMERIC

## 2022-12-06 PROCEDURE — 76819 FETAL BIOPHYS PROFIL W/O NST: CPT

## 2022-12-06 PROCEDURE — 3074F SYST BP LT 130 MM HG: CPT | Performed by: OBSTETRICS & GYNECOLOGY

## 2022-12-06 PROCEDURE — 3078F DIAST BP <80 MM HG: CPT | Performed by: OBSTETRICS & GYNECOLOGY

## 2022-12-06 PROCEDURE — 76816 OB US FOLLOW-UP PER FETUS: CPT | Performed by: OBSTETRICS & GYNECOLOGY

## 2022-12-06 PROCEDURE — 59025 FETAL NON-STRESS TEST: CPT | Performed by: OBSTETRICS & GYNECOLOGY

## 2022-12-06 PROCEDURE — 81002 URINALYSIS NONAUTO W/O SCOPE: CPT | Performed by: OBSTETRICS & GYNECOLOGY

## 2022-12-06 NOTE — PROGRESS NOTES
Pt here for Growth Ultrasound  +fm noted per patient   Pt noted feeling occas back spasms x3 in past month. Pt denies further complaints.

## 2022-12-06 NOTE — TELEPHONE ENCOUNTER
----- Message from Laila Lama MD sent at 12/6/2022  3:00 PM CST -----  Scheduled for IOL on 1/3 at 07:15   Thank you

## 2022-12-13 ENCOUNTER — APPOINTMENT (OUTPATIENT)
Dept: OBGYN CLINIC | Facility: CLINIC | Age: 27
End: 2022-12-13
Payer: COMMERCIAL

## 2022-12-13 ENCOUNTER — ROUTINE PRENATAL (OUTPATIENT)
Dept: OBGYN CLINIC | Facility: CLINIC | Age: 27
End: 2022-12-13
Payer: COMMERCIAL

## 2022-12-13 VITALS
WEIGHT: 191.19 LBS | DIASTOLIC BLOOD PRESSURE: 60 MMHG | BODY MASS INDEX: 38.54 KG/M2 | HEIGHT: 59 IN | SYSTOLIC BLOOD PRESSURE: 110 MMHG

## 2022-12-13 DIAGNOSIS — Z87.59 HISTORY OF IUFD: ICD-10-CM

## 2022-12-13 DIAGNOSIS — Z34.80 PRENATAL CARE OF MULTIGRAVIDA, ANTEPARTUM: ICD-10-CM

## 2022-12-13 DIAGNOSIS — F32.A DEPRESSION AFFECTING PREGNANCY: ICD-10-CM

## 2022-12-13 DIAGNOSIS — O09.899 RUBELLA NON-IMMUNE STATUS, ANTEPARTUM: ICD-10-CM

## 2022-12-13 DIAGNOSIS — Z34.00 SUPERVISION OF NORMAL FIRST PREGNANCY, ANTEPARTUM: Primary | ICD-10-CM

## 2022-12-13 DIAGNOSIS — Z28.39 RUBELLA NON-IMMUNE STATUS, ANTEPARTUM: ICD-10-CM

## 2022-12-13 DIAGNOSIS — O99.340 DEPRESSION AFFECTING PREGNANCY: ICD-10-CM

## 2022-12-13 PROCEDURE — 3078F DIAST BP <80 MM HG: CPT | Performed by: OBSTETRICS & GYNECOLOGY

## 2022-12-13 PROCEDURE — 59025 FETAL NON-STRESS TEST: CPT | Performed by: OBSTETRICS & GYNECOLOGY

## 2022-12-13 PROCEDURE — 3008F BODY MASS INDEX DOCD: CPT | Performed by: OBSTETRICS & GYNECOLOGY

## 2022-12-13 PROCEDURE — 81002 URINALYSIS NONAUTO W/O SCOPE: CPT | Performed by: OBSTETRICS & GYNECOLOGY

## 2022-12-13 PROCEDURE — 3074F SYST BP LT 130 MM HG: CPT | Performed by: OBSTETRICS & GYNECOLOGY

## 2022-12-13 NOTE — PROGRESS NOTES
DONELL    GA: 36w1d   22  1408   BP: 110/60   Weight: 191 lb 3.2 oz (86.7 kg)   Height: 59\"       Doing well, +FM   Denies LOF/VB/uctx  Mode of delivery:   anticipated  SVE 0/50/-2   GBS collected   Fetal movement count given  Labor precautions provided     Problem List Items Addressed This Visit        Mental Health    Depression affecting pregnancy       Gravid and     History of IUFD    Supervision of normal first pregnancy, antepartum    Relevant Orders    STREP B CULTURE    Rubella non-immune status, antepartum   Other Visit Diagnoses     Prenatal care of multigravida, antepartum    -  Primary            RTC 1 week    NST reactive and reassuring           Note to patient and family   The [facility] makes medical notes available to patients in the interest of transparency. However, please be advised that this is a medical document. It is intended as zqqp-or-haph communication. It is written and medical language may contain abbreviations or verbiage that are technical and unfamiliar. It may appear blunt or direct. Medical documents are intended to carry relevant information, facts as evident, and the clinical opinion of the practitioner.

## 2022-12-19 ENCOUNTER — MOBILE ENCOUNTER (OUTPATIENT)
Dept: OBGYN CLINIC | Facility: CLINIC | Age: 27
End: 2022-12-19

## 2022-12-19 ENCOUNTER — NURSE ONLY (OUTPATIENT)
Dept: OBGYN CLINIC | Facility: CLINIC | Age: 27
End: 2022-12-19
Payer: COMMERCIAL

## 2022-12-19 VITALS
DIASTOLIC BLOOD PRESSURE: 80 MMHG | SYSTOLIC BLOOD PRESSURE: 116 MMHG | BODY MASS INDEX: 39 KG/M2 | WEIGHT: 193 LBS | HEART RATE: 93 BPM

## 2022-12-19 DIAGNOSIS — Z34.90 PRENATAL CARE, ANTEPARTUM: Primary | ICD-10-CM

## 2022-12-19 DIAGNOSIS — O36.8190 DECREASED FETAL MOVEMENT AFFECTING MANAGEMENT OF PREGNANCY, ANTEPARTUM, SINGLE OR UNSPECIFIED FETUS: ICD-10-CM

## 2022-12-19 PROCEDURE — 3074F SYST BP LT 130 MM HG: CPT | Performed by: NURSE PRACTITIONER

## 2022-12-19 PROCEDURE — 59025 FETAL NON-STRESS TEST: CPT | Performed by: NURSE PRACTITIONER

## 2022-12-19 PROCEDURE — 81002 URINALYSIS NONAUTO W/O SCOPE: CPT | Performed by: NURSE PRACTITIONER

## 2022-12-19 PROCEDURE — 3079F DIAST BP 80-89 MM HG: CPT | Performed by: NURSE PRACTITIONER

## 2022-12-19 NOTE — PROGRESS NOTES
DONELL  Doing well, +FM- encouraged she call with any decreased fetal movement  Denies VB/LOF/uctx  Mode of delivery:  anticipated  Labor precautions discussed  SVE deferred  RTC 1 week with NST  NST reactive

## 2022-12-19 NOTE — PROGRESS NOTES
Returned patient's page. Patient reports no fetal movement this morning. Patient did eat breakfast and did move around. Denies contractions or leakage of fluid. Denies vaginal bleeding. Patient stated that she had just prior arrived in the hospital parking lot. Patient advised to come to the hospital for further evaluation. Patient verbalized understanding. All questions and concerns were addressed.      Elton Desai MD   [facility] - OBGYN

## 2022-12-27 ENCOUNTER — ROUTINE PRENATAL (OUTPATIENT)
Dept: OBGYN CLINIC | Facility: CLINIC | Age: 27
End: 2022-12-27
Payer: COMMERCIAL

## 2022-12-27 ENCOUNTER — APPOINTMENT (OUTPATIENT)
Dept: OBGYN CLINIC | Facility: CLINIC | Age: 27
End: 2022-12-27
Payer: COMMERCIAL

## 2022-12-27 ENCOUNTER — HOSPITAL ENCOUNTER (INPATIENT)
Facility: HOSPITAL | Age: 27
LOS: 2 days | Discharge: HOME | End: 2022-12-29
Attending: OBSTETRICS & GYNECOLOGY | Admitting: OBSTETRICS & GYNECOLOGY
Payer: COMMERCIAL

## 2022-12-27 ENCOUNTER — TELEPHONE (OUTPATIENT)
Dept: OBGYN CLINIC | Facility: CLINIC | Age: 27
End: 2022-12-27

## 2022-12-27 ENCOUNTER — ANESTHESIA (OUTPATIENT)
Dept: OBGYN UNIT | Facility: HOSPITAL | Age: 27
End: 2022-12-27
Payer: COMMERCIAL

## 2022-12-27 ENCOUNTER — ANESTHESIA EVENT (OUTPATIENT)
Dept: OBGYN UNIT | Facility: HOSPITAL | Age: 27
End: 2022-12-27
Payer: COMMERCIAL

## 2022-12-27 VITALS
HEART RATE: 77 BPM | HEIGHT: 59 IN | SYSTOLIC BLOOD PRESSURE: 110 MMHG | WEIGHT: 196 LBS | DIASTOLIC BLOOD PRESSURE: 70 MMHG | BODY MASS INDEX: 39.51 KG/M2

## 2022-12-27 DIAGNOSIS — Z3A.38 38 WEEKS GESTATION OF PREGNANCY: Primary | ICD-10-CM

## 2022-12-27 DIAGNOSIS — Z87.59 HISTORY OF IUFD: ICD-10-CM

## 2022-12-27 PROBLEM — Z34.00 SUPERVISION OF NORMAL FIRST PREGNANCY, ANTEPARTUM: Status: RESOLVED | Noted: 2022-06-28 | Resolved: 2022-12-27

## 2022-12-27 PROBLEM — Z34.90 PREGNANCY: Status: ACTIVE | Noted: 2022-12-27

## 2022-12-27 PROBLEM — Z34.90 PREGNANCY: Status: RESOLVED | Noted: 2022-12-27 | Resolved: 2022-12-27

## 2022-12-27 LAB
ANTIBODY SCREEN: NEGATIVE
BASOPHILS # BLD AUTO: 0.03 X10(3) UL (ref 0–0.2)
BASOPHILS NFR BLD AUTO: 0.3 %
EOSINOPHIL # BLD AUTO: 0.03 X10(3) UL (ref 0–0.7)
EOSINOPHIL NFR BLD AUTO: 0.3 %
ERYTHROCYTE [DISTWIDTH] IN BLOOD BY AUTOMATED COUNT: 12.7 %
GLUCOSE (URINE DIPSTICK): NEGATIVE MG/DL
HCT VFR BLD AUTO: 40.3 %
HGB BLD-MCNC: 13.8 G/DL
IMM GRANULOCYTES # BLD AUTO: 0.04 X10(3) UL (ref 0–1)
IMM GRANULOCYTES NFR BLD: 0.4 %
LYMPHOCYTES # BLD AUTO: 1.47 X10(3) UL (ref 1–4)
LYMPHOCYTES NFR BLD AUTO: 14.4 %
MCH RBC QN AUTO: 31.4 PG (ref 26–34)
MCHC RBC AUTO-ENTMCNC: 34.2 G/DL (ref 31–37)
MCV RBC AUTO: 91.8 FL
MONOCYTES # BLD AUTO: 0.41 X10(3) UL (ref 0.1–1)
MONOCYTES NFR BLD AUTO: 4 %
MULTISTIX LOT#: NORMAL NUMERIC
NEUTROPHILS # BLD AUTO: 8.2 X10 (3) UL (ref 1.5–7.7)
NEUTROPHILS # BLD AUTO: 8.2 X10(3) UL (ref 1.5–7.7)
NEUTROPHILS NFR BLD AUTO: 80.6 %
PLATELET # BLD AUTO: 279 10(3)UL (ref 150–450)
RBC # BLD AUTO: 4.39 X10(6)UL
RH BLOOD TYPE: POSITIVE
SARS-COV-2 RNA RESP QL NAA+PROBE: NOT DETECTED
T PALLIDUM AB SER QL IA: NONREACTIVE
WBC # BLD AUTO: 10.2 X10(3) UL (ref 4–11)

## 2022-12-27 PROCEDURE — 3008F BODY MASS INDEX DOCD: CPT | Performed by: OBSTETRICS & GYNECOLOGY

## 2022-12-27 PROCEDURE — 3078F DIAST BP <80 MM HG: CPT | Performed by: OBSTETRICS & GYNECOLOGY

## 2022-12-27 PROCEDURE — 81002 URINALYSIS NONAUTO W/O SCOPE: CPT | Performed by: OBSTETRICS & GYNECOLOGY

## 2022-12-27 PROCEDURE — 59400 OBSTETRICAL CARE: CPT | Performed by: OBSTETRICS & GYNECOLOGY

## 2022-12-27 PROCEDURE — 3074F SYST BP LT 130 MM HG: CPT | Performed by: OBSTETRICS & GYNECOLOGY

## 2022-12-27 PROCEDURE — 0KQM0ZZ REPAIR PERINEUM MUSCLE, OPEN APPROACH: ICD-10-PCS | Performed by: OBSTETRICS & GYNECOLOGY

## 2022-12-27 PROCEDURE — 59025 FETAL NON-STRESS TEST: CPT | Performed by: OBSTETRICS & GYNECOLOGY

## 2022-12-27 RX ORDER — NALBUPHINE HCL 10 MG/ML
2.5 AMPUL (ML) INJECTION
Status: DISCONTINUED | OUTPATIENT
Start: 2022-12-27 | End: 2022-12-28

## 2022-12-27 RX ORDER — ACETAMINOPHEN 500 MG
1000 TABLET ORAL EVERY 6 HOURS PRN
Status: DISCONTINUED | OUTPATIENT
Start: 2022-12-27 | End: 2022-12-29

## 2022-12-27 RX ORDER — LIDOCAINE HYDROCHLORIDE AND EPINEPHRINE 15; 5 MG/ML; UG/ML
INJECTION, SOLUTION EPIDURAL AS NEEDED
Status: DISCONTINUED | OUTPATIENT
Start: 2022-12-27 | End: 2022-12-27 | Stop reason: SURG

## 2022-12-27 RX ORDER — TRISODIUM CITRATE DIHYDRATE AND CITRIC ACID MONOHYDRATE 500; 334 MG/5ML; MG/5ML
30 SOLUTION ORAL AS NEEDED
Status: DISCONTINUED | OUTPATIENT
Start: 2022-12-27 | End: 2022-12-27 | Stop reason: HOSPADM

## 2022-12-27 RX ORDER — ACETAMINOPHEN 500 MG
500 TABLET ORAL EVERY 6 HOURS PRN
Status: DISCONTINUED | OUTPATIENT
Start: 2022-12-27 | End: 2022-12-27 | Stop reason: HOSPADM

## 2022-12-27 RX ORDER — AMMONIA INHALANTS 0.04 G/.3ML
0.3 INHALANT RESPIRATORY (INHALATION) AS NEEDED
Status: DISCONTINUED | OUTPATIENT
Start: 2022-12-27 | End: 2022-12-27 | Stop reason: HOSPADM

## 2022-12-27 RX ORDER — TERBUTALINE SULFATE 1 MG/ML
0.25 INJECTION, SOLUTION SUBCUTANEOUS AS NEEDED
Status: DISCONTINUED | OUTPATIENT
Start: 2022-12-27 | End: 2022-12-27 | Stop reason: HOSPADM

## 2022-12-27 RX ORDER — SODIUM CHLORIDE, SODIUM LACTATE, POTASSIUM CHLORIDE, CALCIUM CHLORIDE 600; 310; 30; 20 MG/100ML; MG/100ML; MG/100ML; MG/100ML
INJECTION, SOLUTION INTRAVENOUS CONTINUOUS
Status: DISCONTINUED | OUTPATIENT
Start: 2022-12-27 | End: 2022-12-27 | Stop reason: HOSPADM

## 2022-12-27 RX ORDER — ONDANSETRON 2 MG/ML
4 INJECTION INTRAMUSCULAR; INTRAVENOUS EVERY 6 HOURS PRN
Status: DISCONTINUED | OUTPATIENT
Start: 2022-12-27 | End: 2022-12-27 | Stop reason: HOSPADM

## 2022-12-27 RX ORDER — DEXTROSE, SODIUM CHLORIDE, SODIUM LACTATE, POTASSIUM CHLORIDE, AND CALCIUM CHLORIDE 5; .6; .31; .03; .02 G/100ML; G/100ML; G/100ML; G/100ML; G/100ML
INJECTION, SOLUTION INTRAVENOUS AS NEEDED
Status: DISCONTINUED | OUTPATIENT
Start: 2022-12-27 | End: 2022-12-27 | Stop reason: HOSPADM

## 2022-12-27 RX ORDER — IBUPROFEN 600 MG/1
600 TABLET ORAL EVERY 6 HOURS
Status: DISCONTINUED | OUTPATIENT
Start: 2022-12-27 | End: 2022-12-29

## 2022-12-27 RX ORDER — BUPIVACAINE HCL/0.9 % NACL/PF 0.25 %
5 PLASTIC BAG, INJECTION (ML) EPIDURAL AS NEEDED
Status: DISCONTINUED | OUTPATIENT
Start: 2022-12-27 | End: 2022-12-28

## 2022-12-27 RX ORDER — LIDOCAINE HYDROCHLORIDE 10 MG/ML
INJECTION, SOLUTION EPIDURAL; INFILTRATION; INTRACAUDAL; PERINEURAL AS NEEDED
Status: DISCONTINUED | OUTPATIENT
Start: 2022-12-27 | End: 2022-12-27 | Stop reason: SURG

## 2022-12-27 RX ORDER — SIMETHICONE 80 MG
80 TABLET,CHEWABLE ORAL 3 TIMES DAILY PRN
Status: DISCONTINUED | OUTPATIENT
Start: 2022-12-27 | End: 2022-12-29

## 2022-12-27 RX ORDER — DOCUSATE SODIUM 100 MG/1
100 CAPSULE, LIQUID FILLED ORAL
Status: DISCONTINUED | OUTPATIENT
Start: 2022-12-27 | End: 2022-12-29

## 2022-12-27 RX ORDER — BISACODYL 10 MG
10 SUPPOSITORY, RECTAL RECTAL ONCE AS NEEDED
Status: DISCONTINUED | OUTPATIENT
Start: 2022-12-27 | End: 2022-12-29

## 2022-12-27 RX ORDER — IBUPROFEN 600 MG/1
600 TABLET ORAL EVERY 6 HOURS PRN
Status: DISCONTINUED | OUTPATIENT
Start: 2022-12-27 | End: 2022-12-27 | Stop reason: HOSPADM

## 2022-12-27 RX ORDER — ACETAMINOPHEN 500 MG
500 TABLET ORAL EVERY 6 HOURS PRN
Status: DISCONTINUED | OUTPATIENT
Start: 2022-12-27 | End: 2022-12-29

## 2022-12-27 RX ADMIN — LIDOCAINE HYDROCHLORIDE 3 ML: 10 INJECTION, SOLUTION EPIDURAL; INFILTRATION; INTRACAUDAL; PERINEURAL at 17:55:00

## 2022-12-27 RX ADMIN — LIDOCAINE HYDROCHLORIDE AND EPINEPHRINE 3 ML: 15; 5 INJECTION, SOLUTION EPIDURAL at 18:07:00

## 2022-12-27 NOTE — TELEPHONE ENCOUNTER
G2/P 0100 GA 38 1/7 wks patient complaining of mucous discharge and contractions. Contractions have become a bit more painful, but are not regular. Last OV: 12/13/22 ramirez with Dr. Abena Tavares    Pregnancy Complications: migraines, depression, anxiety, hx of IUFD, rubella non-immune  Abdominal pain: ctx  Leaking of fluid: denies  Vaginal Bleeding: bloody show  Fetal Movement: +  Recommendations: patient has appt in office today. Advised to keep as scheduled. Labor precautions given and questions answered. Patient states understanding and agrees with plan.

## 2022-12-27 NOTE — PROGRESS NOTES
Pt is a 32year old female admitted to -A. Patient presents with:  Laboring     Pt is  38w1d intra-uterine pregnancy. History obtained, consents signed. Oriented to room, staff, and plan of care.

## 2022-12-28 LAB
BASOPHILS # BLD AUTO: 0.04 X10(3) UL (ref 0–0.2)
BASOPHILS NFR BLD AUTO: 0.3 %
EOSINOPHIL # BLD AUTO: 0.08 X10(3) UL (ref 0–0.7)
EOSINOPHIL NFR BLD AUTO: 0.7 %
ERYTHROCYTE [DISTWIDTH] IN BLOOD BY AUTOMATED COUNT: 12.7 %
HCT VFR BLD AUTO: 37.3 %
HGB BLD-MCNC: 12.7 G/DL
IMM GRANULOCYTES # BLD AUTO: 0.04 X10(3) UL (ref 0–1)
IMM GRANULOCYTES NFR BLD: 0.3 %
LYMPHOCYTES # BLD AUTO: 2.17 X10(3) UL (ref 1–4)
LYMPHOCYTES NFR BLD AUTO: 17.8 %
MCH RBC QN AUTO: 31.4 PG (ref 26–34)
MCHC RBC AUTO-ENTMCNC: 34 G/DL (ref 31–37)
MCV RBC AUTO: 92.3 FL
MONOCYTES # BLD AUTO: 0.68 X10(3) UL (ref 0.1–1)
MONOCYTES NFR BLD AUTO: 5.6 %
NEUTROPHILS # BLD AUTO: 9.15 X10 (3) UL (ref 1.5–7.7)
NEUTROPHILS # BLD AUTO: 9.15 X10(3) UL (ref 1.5–7.7)
NEUTROPHILS NFR BLD AUTO: 75.3 %
PLATELET # BLD AUTO: 246 10(3)UL (ref 150–450)
RBC # BLD AUTO: 4.04 X10(6)UL
WBC # BLD AUTO: 12.2 X10(3) UL (ref 4–11)

## 2022-12-28 RX ORDER — SERTRALINE HYDROCHLORIDE 25 MG/1
25 TABLET, FILM COATED ORAL DAILY
Status: DISCONTINUED | OUTPATIENT
Start: 2022-12-28 | End: 2022-12-29

## 2022-12-28 RX ORDER — IBUPROFEN 600 MG/1
600 TABLET ORAL EVERY 6 HOURS PRN
Qty: 30 TABLET | Refills: 0 | Status: SHIPPED | OUTPATIENT
Start: 2022-12-28

## 2022-12-28 RX ORDER — PSEUDOEPHEDRINE HCL 30 MG
100 TABLET ORAL 2 TIMES DAILY PRN
Qty: 30 CAPSULE | Refills: 0 | Status: SHIPPED | OUTPATIENT
Start: 2022-12-28

## 2022-12-28 NOTE — PLAN OF CARE
Problem: BIRTH - VAGINAL/ SECTION  Goal: Fetal and maternal status remain reassuring during the birth process  Description: INTERVENTIONS:  - Monitor vital signs  - Monitor fetal heart rate  - Monitor uterine activity  - Monitor labor progression (vaginal delivery)  - DVT prophylaxis (C/S delivery)  - Surgical antibiotic prophylaxis (C/S delivery)  2022 by Giacomo Pineda RN  Outcome: Progressing  2022 by Giacomo Pineda RN  Outcome: Progressing     Problem: PAIN - ADULT  Goal: Verbalizes/displays adequate comfort level or patient's stated pain goal  Description: INTERVENTIONS:  - Encourage pt to monitor pain and request assistance  - Assess pain using appropriate pain scale  - Administer analgesics based on type and severity of pain and evaluate response  - Implement non-pharmacological measures as appropriate and evaluate response  - Consider cultural and social influences on pain and pain management  - Manage/alleviate anxiety  - Utilize distraction and/or relaxation techniques  - Monitor for opioid side effects  - Notify MD/LIP if interventions unsuccessful or patient reports new pain  - Anticipate increased pain with activity and pre-medicate as appropriate  2022 by Giacomo Pineda RN  Outcome: Progressing  2022 by Giacomo Pineda RN  Outcome: Progressing     Problem: ANXIETY  Goal: Will report anxiety at manageable levels  Description: INTERVENTIONS:  - Administer medication as ordered  - Teach and rehearse alternative coping skills  - Provide emotional support with 1:1 interaction with staff  2022 by Giacomo Pineda RN  Outcome: Progressing  2022 by Giacomo Pineda RN  Outcome: Progressing     Problem: Patient/Family Goals  Goal: Patient/Family Long Term Goal  Description: Patient's Long Term Goal:   Uncomplicated vaginal delivery    Interventions:  VS per protocol  I&O  Ice chips and sips as tolerated  EFM per protocol  Maintain IV as ordered  Antibiotics as needed per protocol  Informed consent     12/27/2022 2014 by Sasha Bach RN  Outcome: Progressing  12/27/2022 2014 by Sasha Bach RN  Outcome: Progressing  Goal: Patient/Family Short Term Goal  Description: Patient's Short Term Goal:   Adequate pain control with delivery of infant  Interventions:  Pain assessment scores as ordered  Patient scores pain a \"3\" or less  Multidisciplinary care   Nonpharmacologic comfort measures       12/27/2022 2014 by Sasha Bach RN  Outcome: Progressing  12/27/2022 2014 by Sasha Bach RN  Outcome: Progressing     Problem: SAFETY ADULT - FALL  Goal: Free from fall injury  Description: INTERVENTIONS:  - Assess pt frequently for physical needs  - Identify cognitive and physical deficits and behaviors that affect risk of falls.   - [place] fall precautions as indicated by assessment.  - Educate pt/family on patient safety including physical limitations  - Instruct pt to call for assistance with activity based on assessment  - Modify environment to reduce risk of injury  - Provide assistive devices as appropriate  - Consider OT/PT consult to assist with strengthening/mobility  - Encourage toileting schedule  12/27/2022 2014 by Sasha Bach RN  Outcome: Progressing  12/27/2022 2014 by Sasha Bach RN  Outcome: Progressing

## 2022-12-28 NOTE — PLAN OF CARE
Problem: BIRTH - VAGINAL/ SECTION  Goal: Fetal and maternal status remain reassuring during the birth process  Description: INTERVENTIONS:  - Monitor vital signs  - Monitor fetal heart rate  - Monitor uterine activity  - Monitor labor progression (vaginal delivery)  - DVT prophylaxis (C/S delivery)  - Surgical antibiotic prophylaxis (C/S delivery)  2022 by Byron Crystal RN  Outcome: Completed  2022 by Byron Crystal RN  Outcome: Progressing  2022 by Byron Crystal RN  Outcome: Progressing     Problem: PAIN - ADULT  Goal: Verbalizes/displays adequate comfort level or patient's stated pain goal  Description: INTERVENTIONS:  - Encourage pt to monitor pain and request assistance  - Assess pain using appropriate pain scale  - Administer analgesics based on type and severity of pain and evaluate response  - Implement non-pharmacological measures as appropriate and evaluate response  - Consider cultural and social influences on pain and pain management  - Manage/alleviate anxiety  - Utilize distraction and/or relaxation techniques  - Monitor for opioid side effects  - Notify MD/LIP if interventions unsuccessful or patient reports new pain  - Anticipate increased pain with activity and pre-medicate as appropriate  2022 by Byron Crystal RN  Outcome: Completed  2022 by Byron Crystal RN  Outcome: Progressing  2022 by Byron Crystal RN  Outcome: Progressing     Problem: ANXIETY  Goal: Will report anxiety at manageable levels  Description: INTERVENTIONS:  - Administer medication as ordered  - Teach and rehearse alternative coping skills  - Provide emotional support with 1:1 interaction with staff  2022 by Byron Crystal RN  Outcome: Completed  2022 by Byron Crystal RN  Outcome: Progressing  2022 by Byron Crystal RN  Outcome: Progressing     Problem: Patient/Family Goals  Goal: Patient/Family Long Term Goal  Description: Patient's Long Term Goal:   Uncomplicated vaginal delivery    Interventions:  VS per protocol  I&O  Ice chips and sips as tolerated  EFM per protocol  Maintain IV as ordered  Antibiotics as needed per protocol  Informed consent     12/27/2022 2205 by Patrick Campoverde RN  Outcome: Completed  12/27/2022 2014 by Patrick Campoverde RN  Outcome: Progressing  12/27/2022 2014 by Patrick Campoverde RN  Outcome: Progressing  Goal: Patient/Family Short Term Goal  Description: Patient's Short Term Goal:   Adequate pain control with delivery of infant  Interventions:  Pain assessment scores as ordered  Patient scores pain a \"3\" or less  Multidisciplinary care   Nonpharmacologic comfort measures       12/27/2022 2205 by Patrick Campoverde RN  Outcome: Completed  12/27/2022 2014 by Patrick Campoverde RN  Outcome: Progressing  12/27/2022 2014 by Patrick Campoverde RN  Outcome: Progressing     Problem: SAFETY ADULT - FALL  Goal: Free from fall injury  Description: INTERVENTIONS:  - Assess pt frequently for physical needs  - Identify cognitive and physical deficits and behaviors that affect risk of falls.   - [place] fall precautions as indicated by assessment.  - Educate pt/family on patient safety including physical limitations  - Instruct pt to call for assistance with activity based on assessment  - Modify environment to reduce risk of injury  - Provide assistive devices as appropriate  - Consider OT/PT consult to assist with strengthening/mobility  - Encourage toileting schedule  12/27/2022 2205 by Patrick Campoverde RN  Outcome: Completed  12/27/2022 2014 by Patrick Campoverde RN  Outcome: Progressing  12/27/2022 2014 by Patrick Campoverde RN  Outcome: Progressing

## 2022-12-28 NOTE — PROGRESS NOTES
Patient up to bathroom with assist x 2. Voided 25cc, efrain care done; pt tolerated well. Patient transferred to mother/baby room 2207 per wheelchair in stable condition with baby and personal belongings. Accompanied by significant other and staff. Report given to mother/baby RN.

## 2022-12-28 NOTE — PLAN OF CARE
Problem: BIRTH - VAGINAL/ SECTION  Goal: Fetal and maternal status remain reassuring during the birth process  Description: INTERVENTIONS:  - Monitor vital signs  - Monitor fetal heart rate  - Monitor uterine activity  - Monitor labor progression (vaginal delivery)  - DVT prophylaxis (C/S delivery)  - Surgical antibiotic prophylaxis (C/S delivery)  Outcome: Progressing     Problem: PAIN - ADULT  Goal: Verbalizes/displays adequate comfort level or patient's stated pain goal  Description: INTERVENTIONS:  - Encourage pt to monitor pain and request assistance  - Assess pain using appropriate pain scale  - Administer analgesics based on type and severity of pain and evaluate response  - Implement non-pharmacological measures as appropriate and evaluate response  - Consider cultural and social influences on pain and pain management  - Manage/alleviate anxiety  - Utilize distraction and/or relaxation techniques  - Monitor for opioid side effects  - Notify MD/LIP if interventions unsuccessful or patient reports new pain  - Anticipate increased pain with activity and pre-medicate as appropriate  Outcome: Progressing     Problem: ANXIETY  Goal: Will report anxiety at manageable levels  Description: INTERVENTIONS:  - Administer medication as ordered  - Teach and rehearse alternative coping skills  - Provide emotional support with 1:1 interaction with staff  Outcome: Progressing     Problem: Patient/Family Goals  Goal: Patient/Family Long Term Goal  Description: Patient's Long Term Goal: safe delivery of infant    Interventions:    - See additional Care Plan goals for specific interventions  Outcome: Progressing  Goal: Patient/Family Short Term Goal  Description: Patient's Short Term Goal: adequate pain control    Interventions:     - See additional Care Plan goals for specific interventions  Outcome: Progressing     Problem: SAFETY ADULT - FALL  Goal: Free from fall injury  Description: INTERVENTIONS:  - Assess pt frequently for physical needs  - Identify cognitive and physical deficits and behaviors that affect risk of falls.   - [place] fall precautions as indicated by assessment.  - Educate pt/family on patient safety including physical limitations  - Instruct pt to call for assistance with activity based on assessment  - Modify environment to reduce risk of injury  - Provide assistive devices as appropriate  - Consider OT/PT consult to assist with strengthening/mobility  - Encourage toileting schedule  Outcome: Progressing

## 2022-12-28 NOTE — PROGRESS NOTES
Mother admitted to Mother Baby unit at this time. Mom in stable condition. Safety measures in place. ID bands verified with infant.

## 2022-12-28 NOTE — ANESTHESIA PROCEDURE NOTES
Labor Analgesia    Date/Time: 12/27/2022 5:55 PM  Performed by: Shayla Rice MD  Authorized by: Shayla Rice MD       General Information and Staff    Start Time:  12/27/2022 5:55 PM  End Time:  12/27/2022 6:07 PM  Anesthesiologist:  Shayla Rice MD  Performed by: Anesthesiologist  Patient Location:  OB  Site Identification: surface landmarks    Reason for Block: labor epidural    Preanesthetic Checklist: patient identified, IV checked, risks and benefits discussed, monitors and equipment checked, pre-op evaluation, timeout performed, IV bolus, anesthesia consent and sterile technique used      Procedure Details    Patient Position:  Sitting  Prep: ChloraPrep    Monitoring:  Heart rate and continuous pulse ox  Approach:  Midline    Epidural Needle    Injection Technique:  JEMAL air  Needle Type:  Tuohy  Needle Gauge:  17 G  Needle Length:  3.375 in  Needle Insertion Depth:  6  Location:  L3-4    Spinal Needle      Catheter    Catheter Type:  End hole  Catheter Size:  19 G  Catheter at Skin Depth:  11  Test Dose:  Negative    Assessment    Sensory Level:  T8    Additional Comments     Test Dose Given at 1807 pm  Fentanyl 2 mc/ml + Ropivicaine 0.15% epidural infusion 10cc/hr  Fentanyl 50 mcg/mL 100 mcg    2cc of 1% lidocaine, 2cc of 1.5% lidocaine and 100mcg of fentanyl bolused through epidural after negative test dose. 0.15% ropiv with 2mcg/cc fentanyl infusion started at 10cc/hr. Tolerated well, getting comfortable.

## 2022-12-28 NOTE — L&D DELIVERY NOTE
Kevin Dodson [[id number]]    Labor Events     labor?: No   steroids?: None  Rupture date/time: 2022 1900     Rupture type: SROM  Induction: None      Presentation    Presentation: Vertex     Operative Delivery    Operative Vaginal Delivery: No            Shoulder Dystocia    Shoulder Dystocia: No     Anesthesia    Method: Epidural           Delivery    Delivery date/time:  22 21:39:54   Delivery type: Normal spontaneous vaginal delivery    Details:     Delivery location: delivery room     Delivery Providers    Delivering Clinician: Ion Chopra MD   Delivery personnel:  Provider Role   Brigida Ray, RN Baby Nurse   Arlene Burdick, RN Delivery Nurse         Cord    Vessels: 3 Vessels  Complications: None  Timed cord clamping: Yes  Time in sec: 30  Cord blood disposition: to lab  Gases sent?: No     Resuscitation    Method: None     [place] Measurements    Weight: 3100 g 6 lb 13.4 oz Length: 48.3 cm   Head circum. : 34 cm Chest circum. : 34 cm      Abdominal circum.: 32 cm       Placenta    Date/time: 2022  Removal: Spontaneous  Appearance: Intact  Disposition: held for future pathology     Apgars    Living status: Living   Apgar Scoring Key:    0 1 2    Skin color Blue or pale Acrocyanotic Completely pink    Heart rate Absent <100 bpm >100 bpm    Reflex irritability No response [place]ace Cry or active withdrawal    Muscle tone Limp Some flexion Active motion    Respiratory effort Absent Weak cry; hypoventilation Good, crying              1 Minute:  5 Minute:  10 Minute:  15 Minute:  20 Minute:    Skin color: 1  1       Heart rate: 2  2       Reflex irritablity: 2  2       Muscle tone: 2  2       Respiratory effort: 2  2       Total: 9  9          Apgars assigned by: Alexey Matias RN  [place] disposition: with mother     Skin to Skin    Skin to skin initiated date/time: 2022  Skin to skin with:  Mother     Vaginal Count    Initial count RN: [place], MIA Jimenez  Initial count Tech: Leatha Henderson   [place]    Initial counts 11   0    Final counts 11   2       Delivery (Maternal)    Perineal lacerations: 2nd Repaired?: Yes   Vaginal laceration?: No    Cervical laceration?: No    Clitoral laceration?: No                                                                      Vaginal Delivery Note          Jayde Flores Patient Status:  Inpatient    1995 MRN [id number]   Location [address] [place] [address] Attending Sarai Allen MD   Hosp Day # 0 PCP Jillian Cummings MD       Pre Op Dx:    3. 33 yo  at 38w1d with live intrauterine pregnancy  2. Active labor      Post Op Dx:   3. 33 yo  s/p     Op: Normal Spontaneous Vaginal Delivery    Surgeon:  Frederick Daniel MD       Anesthesia: epidural    EBL:  See QBL ml    Indications:  See Delivery Summary    Findings:      Head: OA   Sex: male      Weight: 3100 g       Apgars:  9/9       Nuchal: none   Placenta: spontaneous and intact    Unique findings: none      Procedure: The patient was found to be complete and started pushing at 21:07. However, her contractions spaced significantly so a small amount of pitocin was started. The patients was placed in the dorsolithotomy position and prepped. She was encouraged to push and at 21:39 delivered a live male  infant with weight above without difficulty. Infant was placed directly on mother's abdomen for bonding. The cord blood was sampled. IV pitocin and gentle uterine massage helped delivery of the placenta intact with 3 vessels. Umbilical cord gases were not sent. The second degree perineal laceration and right labial laceration was repaired using 2-0 vicryl rapide. The patient was then moved to the supine position in stable condition. Counts were correct. Complications:  None    Mother and infant in good condition.     Frederick Daniel MD, 22, 10:13 PM

## 2022-12-29 VITALS
WEIGHT: 196 LBS | HEIGHT: 59 IN | BODY MASS INDEX: 39.51 KG/M2 | TEMPERATURE: 98 F | SYSTOLIC BLOOD PRESSURE: 131 MMHG | OXYGEN SATURATION: 99 % | RESPIRATION RATE: 17 BRPM | DIASTOLIC BLOOD PRESSURE: 81 MMHG | HEART RATE: 95 BPM

## 2022-12-29 PROCEDURE — 3E0134Z INTRODUCTION OF SERUM, TOXOID AND VACCINE INTO SUBCUTANEOUS TISSUE, PERCUTANEOUS APPROACH: ICD-10-PCS | Performed by: OBSTETRICS & GYNECOLOGY

## 2022-12-31 ENCOUNTER — TELEPHONE (OUTPATIENT)
Dept: OBGYN UNIT | Facility: HOSPITAL | Age: 27
End: 2022-12-31

## 2023-01-12 ENCOUNTER — TELEPHONE (OUTPATIENT)
Dept: OBGYN CLINIC | Facility: CLINIC | Age: 28
End: 2023-01-12

## 2023-01-12 NOTE — TELEPHONE ENCOUNTER
PT DROPPED OFF [facility] PAPERWORK TO BE COMPLETD. PT PD FEE.  PLEASE CALL WHEN READY

## 2023-01-17 ENCOUNTER — MED REC SCAN ONLY (OUTPATIENT)
Dept: OBGYN CLINIC | Facility: CLINIC | Age: 28
End: 2023-01-17

## 2023-01-17 NOTE — TELEPHONE ENCOUNTER
Paperwork completed. Dr. Cadet Lively signed. Copy sent to scanning. Faxed to employer. Copy in plfd.      mcm sent to pt

## 2023-02-15 ENCOUNTER — POSTPARTUM (OUTPATIENT)
Dept: OBGYN CLINIC | Facility: CLINIC | Age: 28
End: 2023-02-15
Payer: COMMERCIAL

## 2023-02-15 VITALS
RESPIRATION RATE: 18 BRPM | SYSTOLIC BLOOD PRESSURE: 120 MMHG | DIASTOLIC BLOOD PRESSURE: 70 MMHG | HEART RATE: 79 BPM | BODY MASS INDEX: 36.29 KG/M2 | HEIGHT: 59 IN | WEIGHT: 180 LBS

## 2023-02-15 DIAGNOSIS — K62.5 RECTAL BLEEDING: ICD-10-CM

## 2023-02-15 DIAGNOSIS — R19.8 PAIN WITH BOWEL MOVEMENTS: ICD-10-CM

## 2023-02-15 PROCEDURE — 3074F SYST BP LT 130 MM HG: CPT | Performed by: OBSTETRICS & GYNECOLOGY

## 2023-02-15 PROCEDURE — 3078F DIAST BP <80 MM HG: CPT | Performed by: OBSTETRICS & GYNECOLOGY

## 2023-02-15 PROCEDURE — 3008F BODY MASS INDEX DOCD: CPT | Performed by: OBSTETRICS & GYNECOLOGY

## 2023-04-06 ENCOUNTER — TELEPHONE (OUTPATIENT)
Dept: OBGYN CLINIC | Facility: CLINIC | Age: 28
End: 2023-04-06

## 2023-04-06 RX ORDER — ESCITALOPRAM OXALATE 10 MG/1
10 TABLET ORAL DAILY
Qty: 30 TABLET | Refills: 1 | Status: SHIPPED | OUTPATIENT
Start: 2023-04-06

## 2023-04-06 NOTE — TELEPHONE ENCOUNTER
Patient is s/p  on 23      Contacted patient. She reports that she discontinued Lexapro at the beginning of pregnancy and then we started Zoloft for her during pregnancy. She is now postpartum. Doing well over all. States that Zoloft works for her, but Lexapro worked better and she would like to go back to that. She believes she was on 20 mg dose. Would like to know if rx can be sent without appt. She will be due for annual in Aug/Sept.     Will consult with provider and call patient back.

## 2023-04-06 NOTE — TELEPHONE ENCOUNTER
Used to take Lexapro prior to becoming pregnant. Was switched to Zoloft during pregnancy. Wants to know if she can be switched back to Lexapro and does she need an appointment for this? Has an appt scheduled for tomorrow but would rather not come in if she doesn't need to.

## 2023-04-06 NOTE — TELEPHONE ENCOUNTER
May give prescription for Lexapro 10 mg. Give 60 day supply which will give enough time to follow up with PCP for additional refills and further management.

## 2023-08-30 ENCOUNTER — TELEPHONE (OUTPATIENT)
Dept: OBGYN CLINIC | Facility: CLINIC | Age: 28
End: 2023-08-30

## 2023-08-30 DIAGNOSIS — N91.2 AMENORRHEA: Primary | ICD-10-CM

## 2023-12-11 RX ORDER — ESCITALOPRAM OXALATE 10 MG/1
10 TABLET ORAL DAILY
Qty: 30 TABLET | Refills: 1 | OUTPATIENT
Start: 2023-12-11

## 2023-12-11 NOTE — TELEPHONE ENCOUNTER
Last OV: 2/15/2023 Postpartum  Last refill date: 4/6/2023 Lexapro 10 mg #30 with 1 refill  Follow-up: RTC in 6 months for annual 8/2023  Next appt.: none scheduled  Refill request denied per protocol. Patient notified by [address] message that appointment is required.

## 2023-12-18 ENCOUNTER — TELEPHONE (OUTPATIENT)
Dept: OBGYN CLINIC | Facility: CLINIC | Age: 28
End: 2023-12-18